# Patient Record
(demographics unavailable — no encounter records)

---

## 2018-01-04 DIAGNOSIS — L70.0 ACNE VULGARIS: ICD-10-CM

## 2018-01-05 RX ORDER — CLINDAMYCIN PHOSPHATE 10 UG/ML
LOTION TOPICAL
Refills: 0 | OUTPATIENT
Start: 2018-01-05

## 2018-01-05 NOTE — TELEPHONE ENCOUNTER
Requested Prescriptions   Pending Prescriptions Disp Refills     clindamycin (CLEOCIN T) 1 % lotion [Pharmacy Med Name: CLINDAMYCIN 1% LOTION 60ML]  0     Sig: APPLY TO FACE DAILY    Topical Acne Medications Protocol Failed    1/5/2018  8:29 AM       Failed - Recent or future visit with authorizing provider's specialty    Patient had office visit in the last year or has a visit in the next 30 days with authorizing provider.  See chart review.              Passed - Patient is 12 years of age or older        Niru Gabriel RN  [facility]  [phone]

## 2018-01-11 DIAGNOSIS — L70.0 ACNE VULGARIS: ICD-10-CM

## 2018-01-15 RX ORDER — CLINDAMYCIN PHOSPHATE 10 UG/ML
LOTION TOPICAL
Refills: 0 | OUTPATIENT
Start: 2018-01-15

## 2018-01-15 NOTE — TELEPHONE ENCOUNTER
"    Requested Prescriptions   Pending Prescriptions Disp Refills     clindamycin (CLEOCIN T) 1 % lotion [Pharmacy Med Name: CLINDAMYCIN 1% LOTION 60ML]  0     Sig: APPLY TO FACE DAILY    Topical Acne Medications Protocol Failed    1/12/2018 11:24 AM       Failed - Recent or future visit with authorizing provider's specialty    Patient had office visit in the last year or has a visit in the next 30 days with authorizing provider.  See \"Patient Info\" tab in inbasket, or \"Choose Columns\" in Meds & Orders section of the refill encounter.              Passed - Patient is 12 years of age or older        Needs office visit for refills per Dr. Johnson.    Niru LANGRN  [facility]  [phone]    "

## 2018-02-14 DIAGNOSIS — L70.0 ACNE VULGARIS: ICD-10-CM

## 2018-02-15 RX ORDER — CLINDAMYCIN PHOSPHATE 10 UG/ML
LOTION TOPICAL
Refills: 0 | OUTPATIENT
Start: 2018-02-15

## 2018-02-15 NOTE — TELEPHONE ENCOUNTER
"Needs office visit for refills per Dr. Johnson per the last two refill requests.     Niru LANGRN  [facility]  [phone]    Requested Prescriptions   Pending Prescriptions Disp Refills     clindamycin (CLEOCIN T) 1 % lotion [Pharmacy Med Name: CLINDAMYCIN 1% LOTION 60ML]  0     Sig: APPLY TO FACE DAILY    Topical Acne Medications Protocol Failed    2/14/2018  7:17 AM       Failed - Recent or future visit with authorizing provider's specialty    Patient had office visit in the last year or has a visit in the next 30 days with authorizing provider.  See \"Patient Info\" tab in inbasket, or \"Choose Columns\" in Meds & Orders section of the refill encounter.            Passed - Patient is 12 years of age or older        Last Written Prescription Date:  08/31/16  Last Fill Quantity: 60 ml,  # refills: 3   Last office visit: 7/29/2015 with prescribing provider:  07/29/15   Future Office Visit:      "